# Patient Record
Sex: FEMALE | Race: WHITE | URBAN - METROPOLITAN AREA
[De-identification: names, ages, dates, MRNs, and addresses within clinical notes are randomized per-mention and may not be internally consistent; named-entity substitution may affect disease eponyms.]

---

## 2018-04-03 ENCOUNTER — HOSPITAL ENCOUNTER (OUTPATIENT)
Dept: CARDIOLOGY | Facility: CLINIC | Age: 58
Discharge: HOME | End: 2018-04-03
Payer: COMMERCIAL

## 2018-04-03 DIAGNOSIS — Z13.6 SCREENING FOR HEART DISEASE: ICD-10-CM

## 2018-04-03 PROCEDURE — 75571 CT HRT W/O DYE W/CA TEST: CPT | Mod: 26 | Performed by: INTERNAL MEDICINE

## 2018-04-03 PROCEDURE — 75571 CT HRT W/O DYE W/CA TEST: CPT

## 2018-04-04 NOTE — PROGRESS NOTES
Calcium score results sent to patient and routed to PCP list in Epic, Lucita Brink          .Mini Serrano, CV Imaging Manager